# Patient Record
Sex: FEMALE | Race: WHITE | NOT HISPANIC OR LATINO | Employment: FULL TIME | URBAN - METROPOLITAN AREA
[De-identification: names, ages, dates, MRNs, and addresses within clinical notes are randomized per-mention and may not be internally consistent; named-entity substitution may affect disease eponyms.]

---

## 2017-08-15 ENCOUNTER — OFFICE VISIT (OUTPATIENT)
Dept: SURGERY | Facility: CLINIC | Age: 27
End: 2017-08-15

## 2017-08-15 VITALS — WEIGHT: 111.6 LBS | HEART RATE: 75 BPM | HEIGHT: 64 IN | OXYGEN SATURATION: 99 % | BODY MASS INDEX: 19.05 KG/M2

## 2017-08-15 DIAGNOSIS — R59.1 DIFFUSE LYMPHADENOPATHY: Primary | ICD-10-CM

## 2017-08-15 PROBLEM — L04.9 LYMPHADENITIS, ACUTE: Status: ACTIVE | Noted: 2017-08-15

## 2017-08-15 PROCEDURE — 99203 OFFICE O/P NEW LOW 30 MIN: CPT | Performed by: SURGERY

## 2017-08-15 RX ORDER — LEVOTHYROXINE SODIUM 100 MCG
TABLET ORAL
Refills: 5 | COMMUNITY
Start: 2017-08-10

## 2017-08-15 RX ORDER — METHYLPHENIDATE HYDROCHLORIDE 36 MG/1
TABLET ORAL
Refills: 0 | COMMUNITY
Start: 2017-08-13

## 2017-08-15 RX ORDER — CEFAZOLIN SODIUM 2 G/100ML
2 INJECTION, SOLUTION INTRAVENOUS ONCE
Status: CANCELLED | OUTPATIENT
Start: 2017-09-06 | End: 2017-08-15

## 2017-08-15 NOTE — PROGRESS NOTES
"General Surgery  Initial Office Visit    CC: Enlarged lymph nodes    HPI: The patient is a pleasant 26 y.o. year-old lady who presents today for evaluation of multiple enlarged lymph nodes that she noticed recently.  She states that back in May, she had a \"bug bite\" on her right upper arm that developed into a classic target sign.  She was concerned for possible Lyme disease and was evaluated by her primary care physician.  At that time she was also noted to have a palpable right axillary lymph node that was enlarged in addition to a right cervical lymph node.  She was tested for Lyme disease, which returned negative that she was placed on doxycycline for her lymphadenitis.  She states that since that time the lymph nodes in her right axilla and right neck have not improved and she has also noticed bilateral inguinal lymphadenopathy as well as occipital and left cervical lymphadenopathy.  For this reason, she was referred to see me for possible lymph node biopsy.  She denies any fevers, chills, or night sweats but has had a recent 11-12 pound unintentional weight loss in the last 2 months.  The lymph nodes themselves have no overlying skin erythema and do not show any soft tissue induration or matting.    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[employer]    ROS:   Constitutional: Positive for appetite change and excess sweating; Negative for fevers or chills  HENT: Negative for hearing loss or runny nose  Eyes: Negative for vision changes or scleral icterus  Respiratory: Negative for cough or " shortness of breath  Cardiovascular: Negative for chest pain or heart palpitations  Gastrointestinal: Negative for abdominal pain, nausea, vomiting, constipation, melena, or hematochezia  Genitourinary: Positive for menstrual problems; Negative for hematuria or dysuria  Musculoskeletal: Negative for joint pain or back pain  Endocrine: Positive for cold intolerance and excess thirst  Hematologic: Positive for easy bruising and adenopathy  Neurologic: Negative for headaches or dizziness  Psychiatric: Positive for agitation and decreased concentration; Negative for anxiety or depression  All other systems reviewed and negative    Physical Exam:  Vitals:    08/15/17 1055   Pulse: 75   SpO2: 99%     General: No acute distress, well-nourished & well-developed  HEAD: normocephalic, atraumatic  EYES: normal conjunctiva, sclera anicteric  EARS: grossly normal hearing  NECK: supple, no thyromegaly, palpable small right cervical lymph node, no supraclavicular lymph nodes palpable bilaterally  CARDIOVASCULAR: regular rate and rhythm  RESPIRATORY: clear to auscultation bilaterally  GASTROINTESTINAL: soft, nontender, non-distended  MUSCULOSKELETAL: normal gait and station. No gross extremity abnormalities  PSYCHIATRIC: oriented x3, normal mood and affect  LYMPHATICS: Small palpable lymph nodes within the right cervical chain ×1, right axilla ×1, right inguinal region x1, left inguinal region x1, and no other lymphadenopathy noted within the left axilla, left neck, supraclavicular regions bilaterally, more occipital region    ASSESSMENT & PLAN  Mrs. Shields is a 26-year-old lady with diffuse lymphadenopathy, that is concerning for possible lymphoma.  She may simply have palpable lymph nodes due to her very thin stature, but with her recent unintentional weight loss and diffuse involvement of her bilateral inguinal lymph nodes, right axillary lymph node, and right cervical lymph node, I think it would be best to obtain an excisional  biopsy for flow cytometry and tissue analysis.  I counseled her on my recommendation to undergo a right axillary and right cervical neck lymph node excision at her earliest convenience and she has consented to proceed.  She inquired about further antibiotic therapy, however I have no suspicion that any of this palpable lymphadenopathy is due to infection as there are multiple areas involved in her inguinal, axillary, and cervical lymph node basins and there are no clinical signs of infection remaining.     Antonia Adames MD  General and Endoscopic Surgery  [facility]    [address], [address]  [place], [place], [address]  P: [phone]  F: [phone]

## 2017-08-30 ENCOUNTER — TELEPHONE (OUTPATIENT)
Dept: SURGERY | Facility: CLINIC | Age: 27
End: 2017-08-30

## 2017-09-05 ENCOUNTER — TELEPHONE (OUTPATIENT)
Dept: SURGERY | Facility: CLINIC | Age: 27
End: 2017-09-05

## 2017-09-05 RX ORDER — IBUPROFEN 400 MG/1
400 TABLET ORAL EVERY 6 HOURS PRN
COMMUNITY

## 2017-09-05 NOTE — TELEPHONE ENCOUNTER
That's fine regarding the decongestant! She will be able to return to work on 9/8/2017 from my perspective. Does she still need to know the results of her imaging performed recently? I just saw the telephone message from Renetta Whittington from 8/30 and I had not spoken with the patient about those results. It was a CT ordered by her PCP, but apparently her PCP was not available to review those results with her. I assume since she saw her PCP for fluid behind her ear that she has met with him/her and has hopefully reviewed the CT imaging results with him.

## 2017-09-05 NOTE — TELEPHONE ENCOUNTER
Pt is scheduled to have Right axillary and right cervical lymp node excision tomorrow 9-6-17. Pt would like to know if she can return to work on Friday 9-8-17. Also, she stated that she went to her pcp and she is having allergy symptoms as well as fluid behind her ear. She was prescribed a decongestant and wanted you to be aware. thanks

## 2017-09-05 NOTE — TELEPHONE ENCOUNTER
Anna, if the patient still needs to know the results of her CT chest, the report states that she has 1 cm axillary (armpit) lymph nodes bilaterally, but otherwise no signs of lymphadenopathy within the chest. I personally cannot review the imaging due to it having been performed at [facility]. I still think it would be in her best interest to have two lymph nodes excised for tissue diagnosis to rule out any underlying lymphoma and this is what we had discussed the last time she saw me in the office. Please let her know. Thanks!

## 2017-09-06 ENCOUNTER — ANESTHESIA EVENT (OUTPATIENT)
Dept: PERIOP | Facility: HOSPITAL | Age: 27
End: 2017-09-06

## 2017-09-06 ENCOUNTER — ANESTHESIA (OUTPATIENT)
Dept: PERIOP | Facility: HOSPITAL | Age: 27
End: 2017-09-06

## 2017-09-06 ENCOUNTER — HOSPITAL ENCOUNTER (OUTPATIENT)
Facility: HOSPITAL | Age: 27
Setting detail: HOSPITAL OUTPATIENT SURGERY
Discharge: HOME | End: 2017-09-06
Attending: SURGERY | Admitting: SURGERY

## 2017-09-06 VITALS
TEMPERATURE: 97.6 F | WEIGHT: 114.38 LBS | OXYGEN SATURATION: 100 % | SYSTOLIC BLOOD PRESSURE: 102 MMHG | DIASTOLIC BLOOD PRESSURE: 74 MMHG | RESPIRATION RATE: 16 BRPM | HEIGHT: 63 IN | BODY MASS INDEX: 20.27 KG/M2 | HEART RATE: 62 BPM

## 2017-09-06 DIAGNOSIS — L04.9 LYMPHADENITIS, ACUTE: ICD-10-CM

## 2017-09-06 DIAGNOSIS — R59.1 DIFFUSE LYMPHADENOPATHY: ICD-10-CM

## 2017-09-06 LAB
B-HCG UR QL: NEGATIVE
INTERNAL NEGATIVE CONTROL: NEGATIVE
INTERNAL POSITIVE CONTROL: POSITIVE
Lab: NORMAL

## 2017-09-06 PROCEDURE — 25010000002 KETOROLAC TROMETHAMINE PER 15 MG: Performed by: NURSE ANESTHETIST, CERTIFIED REGISTERED

## 2017-09-06 PROCEDURE — 38510 BIOPSY/REMOVAL LYMPH NODES: CPT | Performed by: SURGERY

## 2017-09-06 PROCEDURE — 25010000002 PROPOFOL 10 MG/ML EMULSION: Performed by: NURSE ANESTHETIST, CERTIFIED REGISTERED

## 2017-09-06 PROCEDURE — 88305 TISSUE EXAM BY PATHOLOGIST: CPT | Performed by: SURGERY

## 2017-09-06 PROCEDURE — 25010000002 MIDAZOLAM PER 1 MG: Performed by: ANESTHESIOLOGY

## 2017-09-06 PROCEDURE — 38525 BIOPSY/REMOVAL LYMPH NODES: CPT | Performed by: SURGERY

## 2017-09-06 PROCEDURE — 25010000003 CEFAZOLIN IN DEXTROSE 2-4 GM/100ML-% SOLUTION: Performed by: SURGERY

## 2017-09-06 PROCEDURE — 25010000002 DEXAMETHASONE PER 1 MG: Performed by: NURSE ANESTHETIST, CERTIFIED REGISTERED

## 2017-09-06 PROCEDURE — 25010000002 FENTANYL CITRATE (PF) 100 MCG/2ML SOLUTION: Performed by: NURSE ANESTHETIST, CERTIFIED REGISTERED

## 2017-09-06 PROCEDURE — 25010000002 ONDANSETRON PER 1 MG: Performed by: NURSE ANESTHETIST, CERTIFIED REGISTERED

## 2017-09-06 RX ORDER — SODIUM CHLORIDE 0.9 % (FLUSH) 0.9 %
1-10 SYRINGE (ML) INJECTION AS NEEDED
Status: DISCONTINUED | OUTPATIENT
Start: 2017-09-06 | End: 2017-09-06 | Stop reason: HOSPADM

## 2017-09-06 RX ORDER — MIDAZOLAM HYDROCHLORIDE 1 MG/ML
2 INJECTION INTRAMUSCULAR; INTRAVENOUS
Status: DISCONTINUED | OUTPATIENT
Start: 2017-09-06 | End: 2017-09-06 | Stop reason: HOSPADM

## 2017-09-06 RX ORDER — CEFAZOLIN SODIUM 2 G/100ML
2 INJECTION, SOLUTION INTRAVENOUS ONCE
Status: COMPLETED | OUTPATIENT
Start: 2017-09-06 | End: 2017-09-06

## 2017-09-06 RX ORDER — LIDOCAINE HYDROCHLORIDE 20 MG/ML
INJECTION, SOLUTION INFILTRATION; PERINEURAL AS NEEDED
Status: DISCONTINUED | OUTPATIENT
Start: 2017-09-06 | End: 2017-09-06 | Stop reason: SURG

## 2017-09-06 RX ORDER — LABETALOL HYDROCHLORIDE 5 MG/ML
5 INJECTION, SOLUTION INTRAVENOUS
Status: DISCONTINUED | OUTPATIENT
Start: 2017-09-06 | End: 2017-09-06 | Stop reason: HOSPADM

## 2017-09-06 RX ORDER — FAMOTIDINE 10 MG/ML
20 INJECTION, SOLUTION INTRAVENOUS ONCE
Status: COMPLETED | OUTPATIENT
Start: 2017-09-06 | End: 2017-09-06

## 2017-09-06 RX ORDER — ONDANSETRON 2 MG/ML
INJECTION INTRAMUSCULAR; INTRAVENOUS AS NEEDED
Status: DISCONTINUED | OUTPATIENT
Start: 2017-09-06 | End: 2017-09-06 | Stop reason: SURG

## 2017-09-06 RX ORDER — ALBUTEROL SULFATE 2.5 MG/3ML
2.5 SOLUTION RESPIRATORY (INHALATION) ONCE AS NEEDED
Status: DISCONTINUED | OUTPATIENT
Start: 2017-09-06 | End: 2017-09-06 | Stop reason: HOSPADM

## 2017-09-06 RX ORDER — DEXAMETHASONE SODIUM PHOSPHATE 10 MG/ML
INJECTION INTRAMUSCULAR; INTRAVENOUS AS NEEDED
Status: DISCONTINUED | OUTPATIENT
Start: 2017-09-06 | End: 2017-09-06 | Stop reason: SURG

## 2017-09-06 RX ORDER — MAGNESIUM HYDROXIDE 1200 MG/15ML
LIQUID ORAL AS NEEDED
Status: DISCONTINUED | OUTPATIENT
Start: 2017-09-06 | End: 2017-09-06 | Stop reason: HOSPADM

## 2017-09-06 RX ORDER — DIPHENHYDRAMINE HYDROCHLORIDE 50 MG/ML
12.5 INJECTION INTRAMUSCULAR; INTRAVENOUS
Status: DISCONTINUED | OUTPATIENT
Start: 2017-09-06 | End: 2017-09-06 | Stop reason: HOSPADM

## 2017-09-06 RX ORDER — OXYCODONE HYDROCHLORIDE AND ACETAMINOPHEN 5; 325 MG/1; MG/1
1 TABLET ORAL EVERY 6 HOURS PRN
Qty: 20 TABLET | Refills: 0 | Status: SHIPPED | OUTPATIENT
Start: 2017-09-06 | End: 2017-09-19

## 2017-09-06 RX ORDER — PROPOFOL 10 MG/ML
VIAL (ML) INTRAVENOUS AS NEEDED
Status: DISCONTINUED | OUTPATIENT
Start: 2017-09-06 | End: 2017-09-06 | Stop reason: SURG

## 2017-09-06 RX ORDER — ONDANSETRON 2 MG/ML
4 INJECTION INTRAMUSCULAR; INTRAVENOUS ONCE AS NEEDED
Status: DISCONTINUED | OUTPATIENT
Start: 2017-09-06 | End: 2017-09-06 | Stop reason: HOSPADM

## 2017-09-06 RX ORDER — MIDAZOLAM HYDROCHLORIDE 1 MG/ML
1 INJECTION INTRAMUSCULAR; INTRAVENOUS
Status: DISCONTINUED | OUTPATIENT
Start: 2017-09-06 | End: 2017-09-06 | Stop reason: HOSPADM

## 2017-09-06 RX ORDER — KETOROLAC TROMETHAMINE 30 MG/ML
INJECTION, SOLUTION INTRAMUSCULAR; INTRAVENOUS AS NEEDED
Status: DISCONTINUED | OUTPATIENT
Start: 2017-09-06 | End: 2017-09-06 | Stop reason: SURG

## 2017-09-06 RX ORDER — FENTANYL CITRATE 50 UG/ML
INJECTION, SOLUTION INTRAMUSCULAR; INTRAVENOUS AS NEEDED
Status: DISCONTINUED | OUTPATIENT
Start: 2017-09-06 | End: 2017-09-06 | Stop reason: SURG

## 2017-09-06 RX ORDER — HYDRALAZINE HYDROCHLORIDE 20 MG/ML
5 INJECTION INTRAMUSCULAR; INTRAVENOUS
Status: DISCONTINUED | OUTPATIENT
Start: 2017-09-06 | End: 2017-09-06 | Stop reason: HOSPADM

## 2017-09-06 RX ORDER — FENTANYL CITRATE 50 UG/ML
50 INJECTION, SOLUTION INTRAMUSCULAR; INTRAVENOUS
Status: DISCONTINUED | OUTPATIENT
Start: 2017-09-06 | End: 2017-09-06 | Stop reason: HOSPADM

## 2017-09-06 RX ORDER — BUPIVACAINE HYDROCHLORIDE AND EPINEPHRINE 5; 5 MG/ML; UG/ML
INJECTION, SOLUTION PERINEURAL AS NEEDED
Status: DISCONTINUED | OUTPATIENT
Start: 2017-09-06 | End: 2017-09-06 | Stop reason: HOSPADM

## 2017-09-06 RX ORDER — SODIUM CHLORIDE, SODIUM LACTATE, POTASSIUM CHLORIDE, CALCIUM CHLORIDE 600; 310; 30; 20 MG/100ML; MG/100ML; MG/100ML; MG/100ML
9 INJECTION, SOLUTION INTRAVENOUS CONTINUOUS
Status: DISCONTINUED | OUTPATIENT
Start: 2017-09-06 | End: 2017-09-06 | Stop reason: HOSPADM

## 2017-09-06 RX ADMIN — DEXAMETHASONE SODIUM PHOSPHATE 4 MG: 10 INJECTION INTRAMUSCULAR; INTRAVENOUS at 08:17

## 2017-09-06 RX ADMIN — FENTANYL CITRATE 25 MCG: 50 INJECTION INTRAMUSCULAR; INTRAVENOUS at 08:16

## 2017-09-06 RX ADMIN — PROPOFOL 200 MG: 10 INJECTION, EMULSION INTRAVENOUS at 08:12

## 2017-09-06 RX ADMIN — KETOROLAC TROMETHAMINE 30 MG: 30 INJECTION, SOLUTION INTRAMUSCULAR; INTRAVENOUS at 08:17

## 2017-09-06 RX ADMIN — FAMOTIDINE 20 MG: 10 INJECTION, SOLUTION INTRAVENOUS at 07:19

## 2017-09-06 RX ADMIN — ONDANSETRON 4 MG: 2 INJECTION INTRAMUSCULAR; INTRAVENOUS at 08:17

## 2017-09-06 RX ADMIN — PROPOFOL 50 MG: 10 INJECTION, EMULSION INTRAVENOUS at 08:13

## 2017-09-06 RX ADMIN — LIDOCAINE HYDROCHLORIDE 40 MG: 20 INJECTION, SOLUTION INFILTRATION; PERINEURAL at 08:12

## 2017-09-06 RX ADMIN — CEFAZOLIN SODIUM 2 G: 2 INJECTION, SOLUTION INTRAVENOUS at 08:12

## 2017-09-06 RX ADMIN — MIDAZOLAM 2 MG: 1 INJECTION INTRAMUSCULAR; INTRAVENOUS at 07:19

## 2017-09-06 RX ADMIN — FENTANYL CITRATE 75 MCG: 50 INJECTION INTRAMUSCULAR; INTRAVENOUS at 08:49

## 2017-09-06 RX ADMIN — SODIUM CHLORIDE, POTASSIUM CHLORIDE, SODIUM LACTATE AND CALCIUM CHLORIDE 9 ML/HR: 600; 310; 30; 20 INJECTION, SOLUTION INTRAVENOUS at 07:19

## 2017-09-06 NOTE — OP NOTE
Operative Note :  Antonia Adames MD      Debra Shields  1990    Procedure Date: 09/06/17    Pre-op Diagnosis:  · Diffuse lymphadenopathy    Post-op Diagnosis:  · Diffuse lymphadenopathy    Procedure:   · Right cervical and right axillary lymph node excisional biopsy    Surgeon: Antonia Adames MD    Assistant: Samuel Banuelos PA-S    Anesthesia:  General (general endotracheal tube)    Estimated Blood Loss: 5 cc    Specimens: Right cervical lymph node (in formalin), right axillary lymph node (in saline for fresh)    Complications: None    Indications:  · Ms. Shields is a 26-year-old girl who in the last 6 months has noticed persistent diffuse lymphadenopathy in her bilateral inguinal regions, right axilla, and bilateral cervical regions.  These developed after an acute viral prodrome, but that never resolved.  She has been worked up and sent to see me for possible lymph node excision to rule out lymphoma.  She has also had an unintentional 20 pound weight loss as well as some intermittent night sweats.    Findings:   · Enlarged right cervical lymph node, multiple right axillary lymph nodes removed, with 1 measuring approximately 1.0 cm    Description of procedure:  The patient was brought to the operating room and placed on the OR table in supine position.  An endotracheal tube was inserted, and general anesthesia was induced.  Her right neck, chest, and axilla were prepped and draped in a sterile fashion.  A surgical timeout was completed.  A 2 cm incision was made transversely across the right lateral lower neck overlying the palpable lymph node.  The subcutaneous tissues tissues were divided down to the level of the external jugular vein which was swept medially.  The underlying lymph node was slowly dissected out using electrocautery and blunt dissection with the hemostat until it was completely removed from its surrounding soft tissue attachments.  It was passed off to pathology in formalin.  The  remaining wound was inspected and closed primarily with 3-0 Vicryl deep dermal sutures and a running 4-0 Vicryl subcuticular stitch.  The right axilla was then inspected and a 3 cm incision made overlying the palpable lymph node.  The underlying subcutaneous tissues were divided with Bovie electrocautery until the pectoral muscles were identified medially.  These were swept anteriorly and the underlying axillary fat pad was slowly dissected out up to the level of the axillary vein.  The thoracodorsal and long thoracic nerve were also identified during the dissection and preserved.  The axillary fat pad showed one enlarged lymph node with 3 smaller adjacent lymph nodes.  These were dissected out using electrocautery and passed off to pathology in saline for flow cytometry to rule out lymphoma.  The remaining lymph node basin was kept intact.  The axillary cavity was then irrigated with warm normal saline and blotted dry.  The incision was closed primarily using interrupted 3-0 Vicryl deep dermal sutures and a running 4-0 Vicryl subcuticular stitch.  Each incision was dressed with Dermabond.  The patient was then extubated and transferred to PACU in stable condition. All counts were correct per nursing.    Antonia Adames MD  General and Endoscopic Surgery  [facility]    [address], [address]  [place], [place], [address]  P: [phone]  F: [phone]

## 2017-09-06 NOTE — PLAN OF CARE
Problem: Perioperative Period (Adult)  Goal: Signs and Symptoms of Listed Potential Problems Will be Absent or Manageable (Perioperative Period)  Outcome: Outcome(s) achieved Date Met:  09/06/17 09/06/17 1045   Perioperative Period   Problems Present (Perioperative Period) none

## 2017-09-06 NOTE — PLAN OF CARE
Problem: Patient Care Overview (Adult)  Goal: Adult Individualization and Mutuality  Outcome: Outcome(s) achieved Date Met:  09/06/17

## 2017-09-06 NOTE — PLAN OF CARE
Problem: Patient Care Overview (Adult)  Goal: Plan of Care Review  Outcome: Ongoing (interventions implemented as appropriate)    09/06/17 0659   Coping/Psychosocial Response Interventions   Plan Of Care Reviewed With patient   Patient Care Overview   Progress progress toward functional goals as expected       Goal: Adult Individualization and Mutuality  Outcome: Ongoing (interventions implemented as appropriate)  Goal: Discharge Needs Assessment  Outcome: Ongoing (interventions implemented as appropriate)    09/06/17 0659   Discharge Needs Assessment   Concerns To Be Addressed no discharge needs identified         Problem: Perioperative Period (Adult)  Goal: Signs and Symptoms of Listed Potential Problems Will be Absent or Manageable (Perioperative Period)  Outcome: Ongoing (interventions implemented as appropriate)    09/06/17 0659   Perioperative Period   Problems Assessed (Perioperative Period) infection   Problems Present (Perioperative Period) infection

## 2017-09-06 NOTE — PLAN OF CARE
Problem: Patient Care Overview (Adult)  Goal: Discharge Needs Assessment  Outcome: Outcome(s) achieved Date Met:  09/06/17 09/06/17 1045   Discharge Needs Assessment   Concerns To Be Addressed no discharge needs identified

## 2017-09-06 NOTE — H&P (VIEW-ONLY)
"General Surgery  Initial Office Visit    CC: Enlarged lymph nodes    HPI: The patient is a pleasant 26 y.o. year-old lady who presents today for evaluation of multiple enlarged lymph nodes that she noticed recently.  She states that back in May, she had a \"bug bite\" on her right upper arm that developed into a classic target sign.  She was concerned for possible Lyme disease and was evaluated by her primary care physician.  At that time she was also noted to have a palpable right axillary lymph node that was enlarged in addition to a right cervical lymph node.  She was tested for Lyme disease, which returned negative that she was placed on doxycycline for her lymphadenitis.  She states that since that time the lymph nodes in her right axilla and right neck have not improved and she has also noticed bilateral inguinal lymphadenopathy as well as occipital and left cervical lymphadenopathy.  For this reason, she was referred to see me for possible lymph node biopsy.  She denies any fevers, chills, or night sweats but has had a recent 11-12 pound unintentional weight loss in the last 2 months.  The lymph nodes themselves have no overlying skin erythema and do not show any soft tissue induration or matting.    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[employer]    ROS:   Constitutional: Positive for appetite change and excess sweating; Negative for fevers or chills  HENT: Negative for hearing loss or runny nose  Eyes: Negative for vision changes or scleral icterus  Respiratory: Negative for cough or " shortness of breath  Cardiovascular: Negative for chest pain or heart palpitations  Gastrointestinal: Negative for abdominal pain, nausea, vomiting, constipation, melena, or hematochezia  Genitourinary: Positive for menstrual problems; Negative for hematuria or dysuria  Musculoskeletal: Negative for joint pain or back pain  Endocrine: Positive for cold intolerance and excess thirst  Hematologic: Positive for easy bruising and adenopathy  Neurologic: Negative for headaches or dizziness  Psychiatric: Positive for agitation and decreased concentration; Negative for anxiety or depression  All other systems reviewed and negative    Physical Exam:  Vitals:    08/15/17 1055   Pulse: 75   SpO2: 99%     General: No acute distress, well-nourished & well-developed  HEAD: normocephalic, atraumatic  EYES: normal conjunctiva, sclera anicteric  EARS: grossly normal hearing  NECK: supple, no thyromegaly, palpable small right cervical lymph node, no supraclavicular lymph nodes palpable bilaterally  CARDIOVASCULAR: regular rate and rhythm  RESPIRATORY: clear to auscultation bilaterally  GASTROINTESTINAL: soft, nontender, non-distended  MUSCULOSKELETAL: normal gait and station. No gross extremity abnormalities  PSYCHIATRIC: oriented x3, normal mood and affect  LYMPHATICS: Small palpable lymph nodes within the right cervical chain ×1, right axilla ×1, right inguinal region x1, left inguinal region x1, and no other lymphadenopathy noted within the left axilla, left neck, supraclavicular regions bilaterally, more occipital region    ASSESSMENT & PLAN  Mrs. Shields is a 26-year-old lady with diffuse lymphadenopathy, that is concerning for possible lymphoma.  She may simply have palpable lymph nodes due to her very thin stature, but with her recent unintentional weight loss and diffuse involvement of her bilateral inguinal lymph nodes, right axillary lymph node, and right cervical lymph node, I think it would be best to obtain an excisional  biopsy for flow cytometry and tissue analysis.  I counseled her on my recommendation to undergo a right axillary and right cervical neck lymph node excision at her earliest convenience and she has consented to proceed.  She inquired about further antibiotic therapy, however I have no suspicion that any of this palpable lymphadenopathy is due to infection as there are multiple areas involved in her inguinal, axillary, and cervical lymph node basins and there are no clinical signs of infection remaining.     Antonia Adames MD  General and Endoscopic Surgery  [facility]    [address], [address]  [place], [place], [address]  P: [phone]  F: [phone]

## 2017-09-06 NOTE — PLAN OF CARE
Problem: Patient Care Overview (Adult)  Goal: Plan of Care Review  Outcome: Outcome(s) achieved Date Met:  09/06/17 09/06/17 1045   Coping/Psychosocial Response Interventions   Plan Of Care Reviewed With patient;significant other   Patient Care Overview   Progress improving   Outcome Evaluation   Outcome Summary/Follow up Plan ready for discharge

## 2017-09-06 NOTE — ANESTHESIA POSTPROCEDURE EVALUATION
"Patient: Debra Shields    Procedure Summary     Date Anesthesia Start Anesthesia Stop Room / Location    09/06/17 0806 0909  [facility]       Procedure Diagnosis Surgeon Provider    Right axillary and right cervical lymph node excision (Right Arm Upper) Lymphadenitis, acute  (Lymphadenitis, acute [L04.9]) MD Jose Cruz Torres MD          Anesthesia Type: general  Last vitals  BP   111/80 (09/06/17 1010)    Temp   36.4 °C (97.6 °F) (09/06/17 0945)    Pulse   66 (09/06/17 1010)   Resp   16 (09/06/17 1010)    SpO2   100 % (09/06/17 1010)      Post Anesthesia Care and Evaluation    Patient location during evaluation: bedside  Patient participation: complete - patient participated  Level of consciousness: awake and alert  Pain management: adequate  Airway patency: patent  Anesthetic complications: No anesthetic complications    Cardiovascular status: acceptable  Respiratory status: acceptable  Hydration status: acceptable    Comments: /80  Pulse 66  Temp 36.4 °C (97.6 °F) (Oral)   Resp 16  Ht 63\" (160 cm)  Wt 114 lb 6 oz (51.9 kg)  LMP 08/27/2017  SpO2 100%  BMI 20.26 kg/m2      "

## 2017-09-06 NOTE — INTERVAL H&P NOTE
H&P reviewed. The patient was examined and there are no changes to the H&P. Plan for right cervical and axillary lymph node excision today to rule out lymphoma.  All questions answered.    Antonia Adames MD  General and Endoscopic Surgery  [facility]    [address], [address]  [place], [place], [address]  P: [phone]  F: [phone]

## 2017-09-06 NOTE — ANESTHESIA PREPROCEDURE EVALUATION
Anesthesia Evaluation     no history of anesthetic complications:  NPO Solid Status: > 8 hours  NPO Liquid Status: > 8 hours     Airway   Mallampati: II  TM distance: >3 FB  Neck ROM: full  no difficulty expected  Dental - normal exam     Pulmonary - negative pulmonary ROS and normal exam   Cardiovascular - normal exam    (+) valvular problems/murmurs,       Neuro/Psych- negative ROS  GI/Hepatic/Renal/Endo    (+)  hypothyroidism,     Musculoskeletal (-) negative ROS    Abdominal  - normal exam   Substance History - negative use     OB/GYN          Other - negative ROS                                       Anesthesia Plan    ASA 2     general     intravenous induction   Anesthetic plan and risks discussed with patient.

## 2017-09-07 LAB — REF LAB TEST METHOD: NORMAL

## 2017-09-08 ENCOUNTER — TELEPHONE (OUTPATIENT)
Dept: SURGERY | Facility: CLINIC | Age: 27
End: 2017-09-08

## 2017-09-08 NOTE — TELEPHONE ENCOUNTER
Patient called for pathology results from Lymph Node biopsy on 09/06/17. Dr. Adames was in the office and she had me inform the patient that we are still waiting on the flow cytometry for a definitive diagnosis.  Patient instructed to call back next Wednesday if she has not heard from Dr. Adames by then.

## 2017-09-12 ENCOUNTER — TELEPHONE (OUTPATIENT)
Dept: SURGERY | Facility: CLINIC | Age: 27
End: 2017-09-12

## 2017-09-12 LAB
CYTO UR: NORMAL
DX PRELIMINARY: NORMAL
LAB AP CASE REPORT: NORMAL
Lab: NORMAL
PATH REPORT.FINAL DX SPEC: NORMAL
PATH REPORT.GROSS SPEC: NORMAL

## 2017-09-13 NOTE — TELEPHONE ENCOUNTER
Per Dr. Adames, ok to give pt final pathology results.  Spoke w/ pt, informed of final pathology results of Benign and no sign of lymphoma, final stains confirmed this and that the lymph nodes removed showed no other worrisome characteristics.  Pt informed that due to her very skinny body habitus, the lymph nodes may have been easier to palpate as opposed to other patients who have a slightly larger body size with mor insulation.  Patient stated that she has always been this size and these were not present prior to May of this year.  Also wanted to know if additional testing was going to ran on the tissue to determine what or why her lymph nodes became enlarged. 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  I resceduled her post-op appt to Thursday 09/14 so that Dr. Adames could address her additional questions and for incision check.

## 2017-09-14 ENCOUNTER — OFFICE VISIT (OUTPATIENT)
Dept: SURGERY | Facility: CLINIC | Age: 27
End: 2017-09-14

## 2017-09-14 DIAGNOSIS — R59.1 LYMPHADENOPATHY: Primary | ICD-10-CM

## 2017-09-14 PROCEDURE — 99024 POSTOP FOLLOW-UP VISIT: CPT | Performed by: SURGERY

## 2017-09-18 ENCOUNTER — TELEPHONE (OUTPATIENT)
Dept: SURGERY | Facility: CLINIC | Age: 27
End: 2017-09-18

## 2017-09-18 NOTE — TELEPHONE ENCOUNTER
Patient made 2nd call, I instructed her that Dr. Adames was not in today but would be in 1st thing tomorrow morning and we would be able to touch base with her to determine if the patient needs to be seen in the office for possible seroma aspiration or if she will need to go to the hospital for US guided drain placement and to determine if the patient needs a referral to the lymphedema clinic or PT.  Patient instructed that if things got worse or pain became unbearable to contact the office and we would let the on-call doctor know or to proceed to the emergency room for further evaluation.  Patient verbalized understanding.

## 2017-09-19 ENCOUNTER — OFFICE VISIT (OUTPATIENT)
Dept: SURGERY | Facility: CLINIC | Age: 27
End: 2017-09-19

## 2017-09-19 DIAGNOSIS — R59.1 LYMPHADENOPATHY: Primary | ICD-10-CM

## 2017-09-19 PROCEDURE — 99024 POSTOP FOLLOW-UP VISIT: CPT | Performed by: SURGERY

## 2017-09-19 NOTE — TELEPHONE ENCOUNTER
Pt called this morning to speak with Dr. Adames. Dr. Adames asked the patient to come into the office today around 11:15am. Pt stated that she would try to get off work and make it to the appointment.

## 2017-09-19 NOTE — PROGRESS NOTES
CHIEF COMPLAINT:   Chief Complaint   Patient presents with   • Post-op     Seroma s/p Right cervical and right axillary lymph node excisional biopsy       HISTORY OF PRESENT ILLNESS:  This is a 26 y.o. female who presents for a post-operative visit after undergoing excisional biopsy of two enlarged lymph nodes within the right cervical and right axillary region on 9/6/2017 for diffuse lymphadenopathy due to lymphoid hyperplasia but no signs of lymphoma. She returned to see me in the office last week, and had a very small seroma within the right axilla but otherwise no major complaints.  She called yesterday into the office stating that her seroma has dramatically enlarged and was compressing on her chest wall, creating some neuropathic pain in the right arm.  Today, her seroma looks markedly larger with bulging outward into the axilla.     Pathology:  1. LYMPH NODE, RIGHT AXILLA, EXCISION:   TWO LYMPH NODES WITH REACTIVE FOLLICULAR AND PARACORTICAL HYPERPLASIA (SEE COMMENT).   NO EVIDENCE OF LYMPHOMA.   2. LYMPH NODE, RIGHT CERVICAL, EXCISION:   ONE LYMPH NODE WITH REACTIVE FOLLICULAR AND PARACORTICAL HYPERPLASIA (SEE COMMENT).   NO EVIDENCE OF LYMPHOMA.       PHYSICAL EXAM:  Neck: right neck incision healing well without edema or erythema  Lungs: Clear to auscultation  Heart: RRR  Ext: right axilla with edema and fluctuance beneath the surgical incision, no overlying erythema    A/P:  This is a 26 y.o. female patient who is S/P excisional biopsy of two enlarged lymph nodes within the right cervical and right axillary region on 9/6/2017      I examined her seroma under ultrasound today and it appeared to be a simple fluid collection without loculations. I easily drained 60 cc of clear yellow fluid under local anesthesia today and completely evacuated the entire cavity as confirmed on ultrasound. I advised her to keep a compression garment on at all times to encourage the seroma not to re-accumulate. This may also by  a lymphatic leak, in which case the fluid will re-accumulate regardless of any external compression. I advised her to call me in 48 hours to notify me if the swelling has recurred, as it would require surgical exploration of the axilla and clipping of any lymphatic leak on Friday afternoon under general anesthesia. She and her  expressed understanding and I will speak with her again this Thursday.    Antonia Adames MD  General and Endoscopic Surgery  [facility]    [address], [address]  [place], [place], [address]  P: [phone]  F: [phone]

## 2017-09-20 ENCOUNTER — TELEPHONE (OUTPATIENT)
Dept: SURGERY | Facility: CLINIC | Age: 27
End: 2017-09-20

## 2017-09-20 ENCOUNTER — PREP FOR SURGERY (OUTPATIENT)
Dept: OTHER | Facility: HOSPITAL | Age: 27
End: 2017-09-20

## 2017-09-20 DIAGNOSIS — I89.9 LYMPH LEAK: Primary | ICD-10-CM

## 2017-09-20 RX ORDER — CEFAZOLIN SODIUM 2 G/100ML
2 INJECTION, SOLUTION INTRAVENOUS ONCE
Status: CANCELLED | OUTPATIENT
Start: 2017-09-22 | End: 2017-09-20

## 2017-09-20 NOTE — TELEPHONE ENCOUNTER
I'm going to go ahead and schedule her for a wound exploration, to ensure this is not a lymphatic leak. I will place the orders now for surgery, please call her and let her know that since the fluid has already re-accumulated it would be safest to explore the wound and fix the problem this Friday in the OR.

## 2017-09-20 NOTE — TELEPHONE ENCOUNTER
Patient called stated that the fluid has begun to accumulate again.  Stated it is not as large but is getting there. Please advise if you want her to come in tomorrow or schedule for surgery on Friday as your previous note stated.  If surgery is needed, please place orders.

## 2017-09-20 NOTE — TELEPHONE ENCOUNTER
I called her and went over the surgery for Friday via a voicemail. I advised her that if she has any further questions, I can call her back tomorrow afternoon while I'm in the office between 1pm-5pm.

## 2017-09-20 NOTE — TELEPHONE ENCOUNTER
Pt's has questions regarding the procedure you are performing on her this Friday and request to speak to you directly. Please contact the patient at [phone] thanks

## 2017-09-21 NOTE — PROGRESS NOTES
CHIEF COMPLAINT:   Chief Complaint   Patient presents with   • Post-op     Right cervical and right axillary lymph node excisional biopsy 9/6/17       HISTORY OF PRESENT ILLNESS:  This is a 26 y.o. female who presents for a post-operative visit after undergoing excisional biopsy of two lymph nodes, one right cervical lymph node and a cluster of right axillary lymph nodes, to rule out lymphoma given her recent onset diffuse lymphadenopathy. The pathology returned with only lymph node hyperplasia, but no signs of lymphoma. She has had some swelling within the right axillary incision which is not bad.  She also has a burning sensation over the right upper arm.  She has otherwise return to normal activities without issue.    Pathology:   1.  LYMPH NODE, RIGHT AXILLA, EXCISION:            TWO LYMPH NODES WITH REACTIVE FOLLICULAR AND PARACORTICAL HYPERPLASIA (SEE COMMENT).           NO EVIDENCE OF LYMPHOMA.      2.  LYMPH NODE, RIGHT CERVICAL, EXCISION:            ONE LYMPH NODE WITH REACTIVE FOLLICULAR AND PARACORTICAL HYPERPLASIA (SEE COMMENT).           NO EVIDENCE OF LYMPHOMA.    PHYSICAL EXAM:  Neck: right neck incision C/D/I healing well  Lungs: Clear  Heart: RRR  ABD: soft, nondistended  Ext: right axillary incision with small seroma but no erythema or drainage, incision healing well    A/P:  This is a 26 y.o. female patient status post right cervical and right axillary lymph node excisional biopsy    I discussed the benign pathology findings with her today.  Her findings of lymph node hyperplasia are somewhat non-specific and may be due to an underlying benign lymph node disorder that I am simply not familiar with. I advised her to read up on Kimura disease or Castlemans Disease.  I also offered to refer her to a hematologist to discuss any further workup that may be necessary.  For the time being, she has declined any further referrals to hematologist.  I advised her to keep her compression garment over her chest  and arm to encourage seroma absorption within the right axilla.  She can follow-up with me as needed and knows to call me in the future if the seroma enlarges, as this may represent a slow lymphatic leak within the axilla.    Antonia Adames MD  General and Endoscopic Surgery  [facility]    [address], [address]  [place], [place], [address]  P: [phone]  F: [phone]

## 2017-09-21 NOTE — TELEPHONE ENCOUNTER
Pt request to speak to you. I informed her that you left a detailed message on her voicemail but she stated she would rather speak to you about her concerns. Please contact the patient at [phone] if you have a chance. Thank you

## 2017-09-22 ENCOUNTER — HOSPITAL ENCOUNTER (OUTPATIENT)
Facility: HOSPITAL | Age: 27
Setting detail: HOSPITAL OUTPATIENT SURGERY
Discharge: HOME | End: 2017-09-22
Attending: SURGERY | Admitting: SURGERY

## 2017-09-22 ENCOUNTER — ANESTHESIA EVENT (OUTPATIENT)
Dept: PERIOP | Facility: HOSPITAL | Age: 27
End: 2017-09-22

## 2017-09-22 ENCOUNTER — ANESTHESIA (OUTPATIENT)
Dept: PERIOP | Facility: HOSPITAL | Age: 27
End: 2017-09-22

## 2017-09-22 VITALS
HEART RATE: 85 BPM | OXYGEN SATURATION: 98 % | SYSTOLIC BLOOD PRESSURE: 122 MMHG | TEMPERATURE: 99.7 F | RESPIRATION RATE: 16 BRPM | DIASTOLIC BLOOD PRESSURE: 81 MMHG | WEIGHT: 117 LBS | BODY MASS INDEX: 20.73 KG/M2

## 2017-09-22 DIAGNOSIS — I89.9 LYMPH LEAK: ICD-10-CM

## 2017-09-22 PROCEDURE — 25010000002 FENTANYL CITRATE (PF) 100 MCG/2ML SOLUTION: Performed by: ANESTHESIOLOGY

## 2017-09-22 PROCEDURE — 25010000002 PROPOFOL 10 MG/ML EMULSION: Performed by: NURSE ANESTHETIST, CERTIFIED REGISTERED

## 2017-09-22 PROCEDURE — S0260 H&P FOR SURGERY: HCPCS | Performed by: SURGERY

## 2017-09-22 PROCEDURE — 87075 CULTR BACTERIA EXCEPT BLOOD: CPT | Performed by: SURGERY

## 2017-09-22 PROCEDURE — 25010000002 KETOROLAC TROMETHAMINE PER 15 MG: Performed by: NURSE ANESTHETIST, CERTIFIED REGISTERED

## 2017-09-22 PROCEDURE — 87070 CULTURE OTHR SPECIMN AEROBIC: CPT | Performed by: SURGERY

## 2017-09-22 PROCEDURE — 25010000002 ONDANSETRON PER 1 MG: Performed by: NURSE ANESTHETIST, CERTIFIED REGISTERED

## 2017-09-22 PROCEDURE — 25010000002 MIDAZOLAM PER 1 MG: Performed by: ANESTHESIOLOGY

## 2017-09-22 PROCEDURE — 25010000002 FENTANYL CITRATE (PF) 100 MCG/2ML SOLUTION: Performed by: NURSE ANESTHETIST, CERTIFIED REGISTERED

## 2017-09-22 PROCEDURE — 25010000003 CEFAZOLIN IN DEXTROSE 2-4 GM/100ML-% SOLUTION: Performed by: SURGERY

## 2017-09-22 PROCEDURE — 10061 I&D ABSCESS COMP/MULTIPLE: CPT | Performed by: SURGERY

## 2017-09-22 PROCEDURE — 87205 SMEAR GRAM STAIN: CPT | Performed by: SURGERY

## 2017-09-22 PROCEDURE — 25010000002 DEXAMETHASONE PER 1 MG: Performed by: NURSE ANESTHETIST, CERTIFIED REGISTERED

## 2017-09-22 RX ORDER — ACETAMINOPHEN 325 MG/1
650 TABLET ORAL ONCE AS NEEDED
Status: DISCONTINUED | OUTPATIENT
Start: 2017-09-22 | End: 2017-09-22 | Stop reason: HOSPADM

## 2017-09-22 RX ORDER — ACETAMINOPHEN 325 MG/1
650 TABLET ORAL ONCE
Status: COMPLETED | OUTPATIENT
Start: 2017-09-22 | End: 2017-09-22

## 2017-09-22 RX ORDER — HYDRALAZINE HYDROCHLORIDE 20 MG/ML
5 INJECTION INTRAMUSCULAR; INTRAVENOUS
Status: DISCONTINUED | OUTPATIENT
Start: 2017-09-22 | End: 2017-09-22 | Stop reason: HOSPADM

## 2017-09-22 RX ORDER — DEXAMETHASONE SODIUM PHOSPHATE 10 MG/ML
INJECTION INTRAMUSCULAR; INTRAVENOUS AS NEEDED
Status: DISCONTINUED | OUTPATIENT
Start: 2017-09-22 | End: 2017-09-22 | Stop reason: SURG

## 2017-09-22 RX ORDER — PROMETHAZINE HYDROCHLORIDE 25 MG/1
25 TABLET ORAL ONCE AS NEEDED
Status: DISCONTINUED | OUTPATIENT
Start: 2017-09-22 | End: 2017-09-22 | Stop reason: HOSPADM

## 2017-09-22 RX ORDER — HYDROMORPHONE HYDROCHLORIDE 1 MG/ML
0.5 INJECTION, SOLUTION INTRAMUSCULAR; INTRAVENOUS; SUBCUTANEOUS
Status: DISCONTINUED | OUTPATIENT
Start: 2017-09-22 | End: 2017-09-22 | Stop reason: HOSPADM

## 2017-09-22 RX ORDER — SODIUM CHLORIDE, SODIUM LACTATE, POTASSIUM CHLORIDE, CALCIUM CHLORIDE 600; 310; 30; 20 MG/100ML; MG/100ML; MG/100ML; MG/100ML
9 INJECTION, SOLUTION INTRAVENOUS CONTINUOUS
Status: DISCONTINUED | OUTPATIENT
Start: 2017-09-22 | End: 2017-09-22 | Stop reason: HOSPADM

## 2017-09-22 RX ORDER — CEFAZOLIN SODIUM 2 G/100ML
2 INJECTION, SOLUTION INTRAVENOUS ONCE
Status: COMPLETED | OUTPATIENT
Start: 2017-09-22 | End: 2017-09-22

## 2017-09-22 RX ORDER — OXYCODONE HYDROCHLORIDE AND ACETAMINOPHEN 5; 325 MG/1; MG/1
1 TABLET ORAL ONCE AS NEEDED
Status: DISCONTINUED | OUTPATIENT
Start: 2017-09-22 | End: 2017-09-22 | Stop reason: HOSPADM

## 2017-09-22 RX ORDER — ACETAMINOPHEN 650 MG/1
650 SUPPOSITORY RECTAL ONCE AS NEEDED
Status: DISCONTINUED | OUTPATIENT
Start: 2017-09-22 | End: 2017-09-22 | Stop reason: HOSPADM

## 2017-09-22 RX ORDER — PROMETHAZINE HYDROCHLORIDE 25 MG/ML
6.25 INJECTION, SOLUTION INTRAMUSCULAR; INTRAVENOUS ONCE AS NEEDED
Status: DISCONTINUED | OUTPATIENT
Start: 2017-09-22 | End: 2017-09-22 | Stop reason: HOSPADM

## 2017-09-22 RX ORDER — KETOROLAC TROMETHAMINE 30 MG/ML
INJECTION, SOLUTION INTRAMUSCULAR; INTRAVENOUS AS NEEDED
Status: DISCONTINUED | OUTPATIENT
Start: 2017-09-22 | End: 2017-09-22 | Stop reason: SURG

## 2017-09-22 RX ORDER — NALBUPHINE HCL 10 MG/ML
10 AMPUL (ML) INJECTION EVERY 4 HOURS PRN
Status: DISCONTINUED | OUTPATIENT
Start: 2017-09-22 | End: 2017-09-22 | Stop reason: HOSPADM

## 2017-09-22 RX ORDER — FAMOTIDINE 10 MG/ML
20 INJECTION, SOLUTION INTRAVENOUS ONCE
Status: COMPLETED | OUTPATIENT
Start: 2017-09-22 | End: 2017-09-22

## 2017-09-22 RX ORDER — ISOSULFAN BLUE 50 MG/5ML
INJECTION, SOLUTION SUBCUTANEOUS AS NEEDED
Status: DISCONTINUED | OUTPATIENT
Start: 2017-09-22 | End: 2017-09-22 | Stop reason: HOSPADM

## 2017-09-22 RX ORDER — PROMETHAZINE HYDROCHLORIDE 25 MG/1
25 SUPPOSITORY RECTAL ONCE AS NEEDED
Status: DISCONTINUED | OUTPATIENT
Start: 2017-09-22 | End: 2017-09-22 | Stop reason: HOSPADM

## 2017-09-22 RX ORDER — MAGNESIUM HYDROXIDE 1200 MG/15ML
LIQUID ORAL AS NEEDED
Status: DISCONTINUED | OUTPATIENT
Start: 2017-09-22 | End: 2017-09-22 | Stop reason: HOSPADM

## 2017-09-22 RX ORDER — LIDOCAINE HYDROCHLORIDE 10 MG/ML
0.5 INJECTION, SOLUTION EPIDURAL; INFILTRATION; INTRACAUDAL; PERINEURAL ONCE AS NEEDED
Status: DISCONTINUED | OUTPATIENT
Start: 2017-09-22 | End: 2017-09-22 | Stop reason: HOSPADM

## 2017-09-22 RX ORDER — FENTANYL CITRATE 50 UG/ML
INJECTION, SOLUTION INTRAMUSCULAR; INTRAVENOUS AS NEEDED
Status: DISCONTINUED | OUTPATIENT
Start: 2017-09-22 | End: 2017-09-22 | Stop reason: SURG

## 2017-09-22 RX ORDER — BUPIVACAINE HYDROCHLORIDE 5 MG/ML
INJECTION, SOLUTION EPIDURAL; INTRACAUDAL AS NEEDED
Status: DISCONTINUED | OUTPATIENT
Start: 2017-09-22 | End: 2017-09-22 | Stop reason: HOSPADM

## 2017-09-22 RX ORDER — LIDOCAINE HYDROCHLORIDE 20 MG/ML
INJECTION, SOLUTION INFILTRATION; PERINEURAL AS NEEDED
Status: DISCONTINUED | OUTPATIENT
Start: 2017-09-22 | End: 2017-09-22 | Stop reason: SURG

## 2017-09-22 RX ORDER — DIPHENHYDRAMINE HYDROCHLORIDE 50 MG/ML
12.5 INJECTION INTRAMUSCULAR; INTRAVENOUS
Status: DISCONTINUED | OUTPATIENT
Start: 2017-09-22 | End: 2017-09-22 | Stop reason: HOSPADM

## 2017-09-22 RX ORDER — MIDAZOLAM HYDROCHLORIDE 1 MG/ML
2 INJECTION INTRAMUSCULAR; INTRAVENOUS
Status: DISCONTINUED | OUTPATIENT
Start: 2017-09-22 | End: 2017-09-22 | Stop reason: HOSPADM

## 2017-09-22 RX ORDER — NALOXONE HCL 0.4 MG/ML
0.4 VIAL (ML) INJECTION AS NEEDED
Status: DISCONTINUED | OUTPATIENT
Start: 2017-09-22 | End: 2017-09-22 | Stop reason: HOSPADM

## 2017-09-22 RX ORDER — NALBUPHINE HCL 10 MG/ML
2 AMPUL (ML) INJECTION EVERY 4 HOURS PRN
Status: DISCONTINUED | OUTPATIENT
Start: 2017-09-22 | End: 2017-09-22 | Stop reason: HOSPADM

## 2017-09-22 RX ORDER — ISOSULFAN BLUE 50 MG/5ML
INJECTION, SOLUTION SUBCUTANEOUS
Status: DISCONTINUED
Start: 2017-09-22 | End: 2017-09-22 | Stop reason: HOSPADM

## 2017-09-22 RX ORDER — SULFAMETHOXAZOLE AND TRIMETHOPRIM 800; 160 MG/1; MG/1
1 TABLET ORAL 2 TIMES DAILY
Qty: 20 TABLET | Refills: 0 | Status: SHIPPED | OUTPATIENT
Start: 2017-09-22 | End: 2017-10-02

## 2017-09-22 RX ORDER — SODIUM CHLORIDE 0.9 % (FLUSH) 0.9 %
1-10 SYRINGE (ML) INJECTION AS NEEDED
Status: DISCONTINUED | OUTPATIENT
Start: 2017-09-22 | End: 2017-09-22 | Stop reason: HOSPADM

## 2017-09-22 RX ORDER — ONDANSETRON 2 MG/ML
INJECTION INTRAMUSCULAR; INTRAVENOUS AS NEEDED
Status: DISCONTINUED | OUTPATIENT
Start: 2017-09-22 | End: 2017-09-22 | Stop reason: SURG

## 2017-09-22 RX ORDER — FENTANYL CITRATE 50 UG/ML
50 INJECTION, SOLUTION INTRAMUSCULAR; INTRAVENOUS
Status: DISCONTINUED | OUTPATIENT
Start: 2017-09-22 | End: 2017-09-22 | Stop reason: HOSPADM

## 2017-09-22 RX ORDER — MIDAZOLAM HYDROCHLORIDE 1 MG/ML
1 INJECTION INTRAMUSCULAR; INTRAVENOUS
Status: DISCONTINUED | OUTPATIENT
Start: 2017-09-22 | End: 2017-09-22 | Stop reason: HOSPADM

## 2017-09-22 RX ORDER — PROPOFOL 10 MG/ML
VIAL (ML) INTRAVENOUS AS NEEDED
Status: DISCONTINUED | OUTPATIENT
Start: 2017-09-22 | End: 2017-09-22 | Stop reason: SURG

## 2017-09-22 RX ADMIN — PROPOFOL 200 MG: 10 INJECTION, EMULSION INTRAVENOUS at 12:53

## 2017-09-22 RX ADMIN — SODIUM CHLORIDE, POTASSIUM CHLORIDE, SODIUM LACTATE AND CALCIUM CHLORIDE 9 ML/HR: 600; 310; 30; 20 INJECTION, SOLUTION INTRAVENOUS at 12:15

## 2017-09-22 RX ADMIN — FAMOTIDINE 20 MG: 10 INJECTION, SOLUTION INTRAVENOUS at 12:15

## 2017-09-22 RX ADMIN — MIDAZOLAM 1 MG: 1 INJECTION INTRAMUSCULAR; INTRAVENOUS at 12:15

## 2017-09-22 RX ADMIN — DEXAMETHASONE SODIUM PHOSPHATE 4 MG: 10 INJECTION INTRAMUSCULAR; INTRAVENOUS at 13:10

## 2017-09-22 RX ADMIN — ACETAMINOPHEN 650 MG: 325 TABLET ORAL at 12:15

## 2017-09-22 RX ADMIN — FENTANYL CITRATE 50 MCG: 50 INJECTION INTRAMUSCULAR; INTRAVENOUS at 14:02

## 2017-09-22 RX ADMIN — FENTANYL CITRATE 50 MCG: 50 INJECTION INTRAMUSCULAR; INTRAVENOUS at 13:03

## 2017-09-22 RX ADMIN — LIDOCAINE HYDROCHLORIDE 100 MG: 20 INJECTION, SOLUTION INFILTRATION; PERINEURAL at 12:53

## 2017-09-22 RX ADMIN — ONDANSETRON 4 MG: 2 INJECTION INTRAMUSCULAR; INTRAVENOUS at 13:27

## 2017-09-22 RX ADMIN — CEFAZOLIN SODIUM 2 G: 2 INJECTION, SOLUTION INTRAVENOUS at 12:47

## 2017-09-22 RX ADMIN — KETOROLAC TROMETHAMINE 30 MG: 30 INJECTION, SOLUTION INTRAMUSCULAR; INTRAVENOUS at 13:27

## 2017-09-22 RX ADMIN — FENTANYL CITRATE 50 MCG: 50 INJECTION INTRAMUSCULAR; INTRAVENOUS at 12:53

## 2017-09-22 NOTE — ANESTHESIA PROCEDURE NOTES
Airway  Urgency: elective    Airway not difficult    General Information and Staff    Patient location during procedure: OR  Anesthesiologist: RENDER, ROSIE RAY  CRNA: SUSU MOONEY    Indications and Patient Condition  Indications for airway management: airway protection    Preoxygenated: yes  MILS not maintained throughout  Mask difficulty assessment: 1 - vent by mask    Final Airway Details  Final airway type: supraglottic airway      Successful airway: classic  Size 4    Number of attempts at approach: 1    Additional Comments  Lma insertion appears atraumatic. Dentition intact.

## 2017-09-22 NOTE — ANESTHESIA PREPROCEDURE EVALUATION
Anesthesia Evaluation     NPO Solid Status: > 8 hours       Airway   Mallampati: I  TM distance: >3 FB  Neck ROM: full  no difficulty expected  Dental      Pulmonary - negative pulmonary ROS and normal exam   Cardiovascular     Rhythm: regular    (+) valvular problems/murmurs,     PE comment: M not ascultated    Neuro/Psych- negative ROS  GI/Hepatic/Renal/Endo - negative ROS     Musculoskeletal     Abdominal  - normal exam   Substance History      OB/GYN          Other                                        Anesthesia Plan    ASA 1     general   (  D/W R&B of GA including but not limited to: heart, lung, liver, kidney, neurologic problems, positioning injuries, dental damage, corneal abrasion and TMJ.  .)  intravenous induction   Anesthetic plan and risks discussed with patient.

## 2017-09-22 NOTE — ANESTHESIA POSTPROCEDURE EVALUATION
Patient: Debra Shields    Procedure Summary     Date Anesthesia Start Anesthesia Stop Room / Location    09/22/17 [id number]  [facility] /  [facility]       Procedure Diagnosis Surgeon Provider    Right axillary wound exploration with ligation of lymphatic vessels (Right ) Lymph leak  (Lymph leak [I89.9]) MD Dameon Torres MD          Anesthesia Type: general  Last vitals  BP   123/81 (09/22/17 1431)    Temp   37.6 °C (99.7 °F) (09/22/17 1439)    Pulse   90 (09/22/17 1436)   Resp   16 (09/22/17 1436)    SpO2   98 % (09/22/17 1436)      Post Anesthesia Care and Evaluation    Patient location during evaluation: bedside  Patient participation: complete - patient participated  Level of consciousness: awake and alert  Pain management: adequate  Airway patency: patent  Anesthetic complications: No anesthetic complications    Cardiovascular status: acceptable  Respiratory status: acceptable  Hydration status: acceptable    Comments: /81  Pulse 90  Temp 37.6 °C (99.7 °F) (Tympanic)   Resp 16  Wt 117 lb (53.1 kg)  [facility] 08/27/2017  SpO2 98%  BMI 20.73 kg/m2

## 2017-09-25 LAB
BACTERIA SPEC AEROBE CULT: NORMAL
GRAM STN SPEC: NORMAL
GRAM STN SPEC: NORMAL

## 2017-09-27 LAB — BACTERIA SPEC ANAEROBE CULT: NORMAL

## 2017-10-06 ENCOUNTER — OFFICE VISIT (OUTPATIENT)
Dept: SURGERY | Facility: CLINIC | Age: 27
End: 2017-10-06

## 2017-10-06 DIAGNOSIS — I89.9 LYMPH LEAK: Primary | ICD-10-CM

## 2017-10-06 DIAGNOSIS — R59.1 LYMPHADENOPATHY: ICD-10-CM

## 2017-10-06 PROCEDURE — 99024 POSTOP FOLLOW-UP VISIT: CPT | Performed by: SURGERY

## 2017-10-06 NOTE — PROGRESS NOTES
CHIEF COMPLAINT:   Chief Complaint   Patient presents with   • Post-op     Right axillary wound exploration with ligation of lymphatic vessels on 09/22/2017       HISTORY OF PRESENT ILLNESS:  This is a 26 y.o. female who presents for a post-operative visit after undergoing a right axillary wound exploration with clip ligation of two small lymphatic vessels that were creating a lymphatic leak within her post-operative wound. Since her second surgery, she has done very well and has been participating in PT exercises for lymphedema and cording of the soft tissues in her right axilla and upper arm. She has had no recurrent swelling or fluid accumulation.     Pathology:  1. LYMPH NODE, RIGHT AXILLA, EXCISION:   TWO LYMPH NODES WITH REACTIVE FOLLICULAR AND PARACORTICAL HYPERPLASIA (SEE COMMENT).   NO EVIDENCE OF LYMPHOMA.   2. LYMPH NODE, RIGHT CERVICAL, EXCISION:   ONE LYMPH NODE WITH REACTIVE FOLLICULAR AND PARACORTICAL HYPERPLASIA (SEE COMMENT).   NO EVIDENCE OF LYMPHOMA.       PHYSICAL EXAM:  Neck: right neck incision healing well without edema or erythema  Lungs: Clear to auscultation  Heart: RRR  Ext: right axilla incision healing well with 3 tiny Vicryl sutures exposed    A/P:  This is a 26 y.o. female patient who is S/P excisional biopsy of two enlarged lymph nodes within the right cervical and right axillary region on 9/6/2017 and right axillary wound exploration with ligation of leaking lymphatic vessels on 9/22/2017    I trimmed all of her Vicryl sutures today.  She has no signs of a recurrent lymphatic leak and no signs of an infection of the wound.  She can follow-up with me as needed, and knows to call me in the future if she ever changes her mind regarding a possible hematology consult for her benign lymphadenopathy.  She is cleared to return to work full-time on October 25, with no further lifting restrictions thereafter.    Antonia Adames MD  General and Endoscopic Surgery  [facility]    [address], [address]  [place], [place], [address]  P: [phone]  F: [phone]

## 2018-02-23 ENCOUNTER — HOSPITAL ENCOUNTER (OUTPATIENT)
Dept: CARDIOLOGY | Facility: HOSPITAL | Age: 28
Discharge: HOME | End: 2018-02-23
Attending: INTERNAL MEDICINE | Admitting: INTERNAL MEDICINE

## (undated) DEVICE — GLV SURG BIOGEL LTX PF 6

## (undated) DEVICE — APPL CHLORAPREP W/TINT 26ML ORNG

## (undated) DEVICE — SKIN PREP TRAY W/CHG: Brand: MEDLINE INDUSTRIES, INC.

## (undated) DEVICE — SUT VIC 3/0 TIES 18IN J110T

## (undated) DEVICE — IRRIGATOR BULB ASEPTO 60CC STRL

## (undated) DEVICE — GLV SURG SENSICARE GREEN W/ALOE PF LF 6.5 STRL

## (undated) DEVICE — SUT ETHLN 4/0 PS2 PLSTC 1667G

## (undated) DEVICE — ELECTRD BLD EDGE/INSUL1P 2.4X5.1MM STRL

## (undated) DEVICE — COVER,LIGHT HANDLE,FLX,2/PK: Brand: MEDLINE INDUSTRIES, INC.

## (undated) DEVICE — DRSNG SURESITE WNDW 4X4.5

## (undated) DEVICE — ANTIBACTERIAL UNDYED BRAIDED (POLYGLACTIN 910), SYNTHETIC ABSORBABLE SUTURE: Brand: COATED VICRYL

## (undated) DEVICE — LOU MINOR PROCEDURE: Brand: MEDLINE INDUSTRIES, INC.

## (undated) DEVICE — PK PROC MINOR TOWER 40

## (undated) DEVICE — CULT AER/ANAEROB FASTIDIOUS BACT

## (undated) DEVICE — GOWN,NON-REINFORCED,SIRUS,SET IN SLV,XL: Brand: MEDLINE

## (undated) DEVICE — SPONGE,LAP,SUPER ABSORBENT,18"X18",5/PK: Brand: MEDLINE

## (undated) DEVICE — SUT VIC 3/0 SH 27IN J416H

## (undated) DEVICE — ADHS SKIN DERMABOND TOP ADVANCED

## (undated) DEVICE — GOWN ,SIRUS,NONREINFORCED SMALL: Brand: MEDLINE

## (undated) DEVICE — GLV SURG BIOGEL LTX PF 6 1/2

## (undated) DEVICE — NDL BLNT 18G 1 1/2IN

## (undated) DEVICE — SUT VIC 5/0 PS2 18IN J495H